# Patient Record
Sex: MALE | Race: OTHER | HISPANIC OR LATINO | URBAN - METROPOLITAN AREA
[De-identification: names, ages, dates, MRNs, and addresses within clinical notes are randomized per-mention and may not be internally consistent; named-entity substitution may affect disease eponyms.]

---

## 2019-07-10 ENCOUNTER — EMERGENCY (EMERGENCY)
Facility: HOSPITAL | Age: 19
LOS: 1 days | Discharge: HOME | End: 2019-07-10
Attending: STUDENT IN AN ORGANIZED HEALTH CARE EDUCATION/TRAINING PROGRAM
Payer: MEDICAID

## 2019-07-10 VITALS
OXYGEN SATURATION: 98 % | TEMPERATURE: 98 F | HEIGHT: 69 IN | DIASTOLIC BLOOD PRESSURE: 74 MMHG | SYSTOLIC BLOOD PRESSURE: 126 MMHG | RESPIRATION RATE: 20 BRPM | WEIGHT: 164.91 LBS | HEART RATE: 106 BPM

## 2019-07-10 PROCEDURE — 99283 EMERGENCY DEPT VISIT LOW MDM: CPT

## 2019-07-10 PROCEDURE — 99053 MED SERV 10PM-8AM 24 HR FAC: CPT

## 2019-07-10 RX ORDER — ACETAMINOPHEN 500 MG
650 TABLET ORAL ONCE
Refills: 0 | Status: COMPLETED | OUTPATIENT
Start: 2019-07-10 | End: 2019-07-10

## 2019-07-11 VITALS — HEART RATE: 71 BPM

## 2019-07-11 PROCEDURE — 73610 X-RAY EXAM OF ANKLE: CPT

## 2019-07-11 PROCEDURE — 99283 EMERGENCY DEPT VISIT LOW MDM: CPT

## 2019-07-11 PROCEDURE — 73610 X-RAY EXAM OF ANKLE: CPT | Mod: 26,LT

## 2019-07-11 RX ADMIN — Medication 650 MILLIGRAM(S): at 00:02

## 2019-07-11 NOTE — ED PROVIDER NOTE - ATTENDING CONTRIBUTION TO CARE
I personally saw the patient with the PA, and completed the key components of the history and physical exam. I then discussed the management plan with the PA.    left ankle sprain

## 2019-07-11 NOTE — ED PROVIDER NOTE - CLINICAL SUMMARY MEDICAL DECISION MAKING FREE TEXT BOX
Pt with left ankle pain s/p inversion injury. Already in air cast from home, ambulating with crutches. Will check xray to eval for ankle fracture, tylenol for pain. F/u ortho if symptoms persist.

## 2019-07-11 NOTE — ED ADULT NURSE NOTE - NSIMPLEMENTINTERV_GEN_ALL_ED
Implemented All Fall Risk Interventions:  [place] to call system. Call bell, personal items and telephone within reach. Instruct patient to call for assistance. Room bathroom lighting operational. Non-slip footwear when patient is off stretcher. Physically safe environment: no spills, clutter or unnecessary equipment. Stretcher in lowest position, wheels locked, appropriate side rails in place. Provide visual cue, wrist band, yellow gown, etc. Monitor gait and stability. Monitor for mental status changes and reorient to person, place, and time. Review medications for side effects contributing to fall risk. Reinforce activity limits and safety measures with patient and family.

## 2019-07-11 NOTE — ED PROVIDER NOTE - NS ED ROS FT
Gen: denies weakness, malaise/fatigue, fever, chills  Skin: denies rashes, hives  HEENT: denies headaches, LOC, visual changes, congestion  Respiratory: denies cough, dyspnea, TURNER, SOB, wheezing  Cardiovascular: denies chest pain, palpitations, diaphoresis, LE edema  MSK: +LEFT ANKLE PAIN. Denies limitation on movement, weakness, joint redness/warmth  Neuro: denies LOC, convulsions/seizures, syncope, headache, dizziness, vertigo, numbness/tingling

## 2019-07-11 NOTE — ED PROVIDER NOTE - PHYSICAL EXAMINATION
Const: Awake, alert and oriented. In no acute distress. Well appearing.  HEENT: NC/AT. Moist mucous membranes.  Eyes: No scleral icterus. EOMI.  Neck:. Soft and supple. Full ROM without pain.  Cardiac: Regular rate and regular rhythm. +S1/S2. Peripheral pulses 2+ and symmetric. No LE edema.  Resp: Speaking in full sentences. No evidence of respiratory distress. No wheezes, rales or rhonchi.  MSK: Mild swelling noted to lateral malleolus on left. FROM at ankle b/l. TTP over lateral malleolus. Pt able to partial weight bear. DP pulses 2+ b/l.  Skin: No bruising, rashes, abrasions or lacerations.  Neuro: Awake, alert & oriented x 3. Sensorimotor intact x 4. Moves all extremities symmetrically.

## 2019-07-11 NOTE — ED PROVIDER NOTE - OBJECTIVE STATEMENT
19y male no pmhx presenting to ED for left ankle pain s/p inversion injury while playing soccer approx 6 hours ago. pt states he was playing soccer and "rolled" his left ankle. Pt took 1 ibuprofen x 3 hours ago with minimal relief. Pt arrives to ED with air cast on and crutches, pt already had at home. No further complaints. Denies joint redness, warmth, laceration, fall, head injury, LOC, CP, SOB.

## 2019-07-11 NOTE — ED ADULT NURSE NOTE - OBJECTIVE STATEMENT
Pt. complaining of left ankle pain.  Pt. states he fell while playing soccer.  Left ankle edema noted; no redness noted; + pulses noted to LLE.  Pt. unable to bear weight to LLE; pt. arrived to ED using crutches from home.  Pta, pt applied ice with minimal relief.

## 2019-07-11 NOTE — ED PROVIDER NOTE - PROGRESS NOTE DETAILS
Xray negative for fracture. Pt encouraged to use ibuprofen and/or tylenol as needed for pain control. Pt educated on RICE measures for pain relief including rest, ice applied to affected area, compression of area with ace wrap and elevation of extremity above heart level. Pt already has air cast and crutches from home. Pt provided with referral for orthopedic doctor and instructed to follow-up within 1-2 weeks if symptoms persist.

## 2019-11-24 ENCOUNTER — EMERGENCY (EMERGENCY)
Facility: HOSPITAL | Age: 19
LOS: 1 days | Discharge: HOME | End: 2019-11-24
Attending: EMERGENCY MEDICINE
Payer: MEDICAID

## 2019-11-24 VITALS
HEIGHT: 64 IN | RESPIRATION RATE: 20 BRPM | HEART RATE: 71 BPM | OXYGEN SATURATION: 99 % | SYSTOLIC BLOOD PRESSURE: 107 MMHG | WEIGHT: 128.09 LBS | TEMPERATURE: 98 F | DIASTOLIC BLOOD PRESSURE: 58 MMHG

## 2019-11-24 PROBLEM — Z78.9 OTHER SPECIFIED HEALTH STATUS: Chronic | Status: ACTIVE | Noted: 2019-07-11

## 2019-11-24 PROCEDURE — 70450 CT HEAD/BRAIN W/O DYE: CPT

## 2019-11-24 PROCEDURE — 99284 EMERGENCY DEPT VISIT MOD MDM: CPT

## 2019-11-24 PROCEDURE — 70450 CT HEAD/BRAIN W/O DYE: CPT | Mod: 26

## 2019-11-24 RX ORDER — NEOMYCIN/POLYMYXIN B/HYDROCORT
4 SUSPENSION, DROPS(FINAL DOSAGE FORM)(ML) OTIC (EAR) ONCE
Refills: 0 | Status: COMPLETED | OUTPATIENT
Start: 2019-11-24 | End: 2019-11-24

## 2019-11-24 RX ADMIN — Medication 4 DROP(S): at 11:52

## 2019-11-24 NOTE — ED STATDOCS - OBJECTIVE STATEMENT
18 y/o M pt with no significant pmhx presents to the ED c/o intermittent R ear pain and headache which. Pt states that he saw his PMD few days and was prescribed ibuprofen with no relief. Pt reports that he feels the headache every 2 hours and is worsening since yesterday. Pt states that he will be seeing his neurologist soon. He suspect that he has developed ear infection which is worsening. Denies nausea, vomiting, diarrhea, fever, chills, diaphoresis, ear discharge, dizziness, syncope, recent travilling. Pt has no further complaints at the moment.

## 2019-11-24 NOTE — ED STATDOCS - CARE PLAN
Principal Discharge DX:	Headache  Assessment and plan of treatment:	Continue with medication as prescribed   F/U [facility]  Secondary Diagnosis:	Otitis externa

## 2019-11-24 NOTE — ED STATDOCS - PHYSICAL EXAMINATION
R ear erythema, no exudate, normal TM + light reflex. no mastoid tenderness, on pal, throt slight, no darinaige,

## 2019-11-24 NOTE — ED STATDOCS - CLINICAL SUMMARY MEDICAL DECISION MAKING FREE TEXT BOX
patient presenting with R ear pain and headache. Will do CT head, give pain medications, ear drops and reevaluate.

## 2019-11-24 NOTE — ED STATDOCS - PATIENT PORTAL LINK FT
You can access the FollowMyHealth Patient Portal offered by [facility] by registering at the following website: [email or web address]. By joining [employer]’s FollowMyHealth portal, you will also be able to view your health information using other applications (apps) compatible with our system.

## 2019-11-24 NOTE — ED ADULT TRIAGE NOTE - CHIEF COMPLAINT QUOTE
headache , went to pmd was given meds but doesn't know what it was , was told he has ear infection not better

## 2019-11-24 NOTE — ED STATDOCS - ENMT, MLM
R ear erythema, no exudate, normal TM, Throat has no vesicles, no oropharyngeal exudates and uvula is midline.

## 2020-01-31 ENCOUNTER — APPOINTMENT (OUTPATIENT)
Dept: DERMATOLOGY | Facility: CLINIC | Age: 20
End: 2020-01-31
Payer: MEDICAID

## 2020-01-31 PROCEDURE — 99203 OFFICE O/P NEW LOW 30 MIN: CPT

## 2021-01-07 ENCOUNTER — APPOINTMENT (OUTPATIENT)
Dept: FAMILY MEDICINE | Facility: CLINIC | Age: 21
End: 2021-01-07
Payer: MEDICAID

## 2021-01-07 VITALS
OXYGEN SATURATION: 97 % | TEMPERATURE: 98.6 F | BODY MASS INDEX: 24.07 KG/M2 | DIASTOLIC BLOOD PRESSURE: 70 MMHG | HEIGHT: 64 IN | SYSTOLIC BLOOD PRESSURE: 122 MMHG | HEART RATE: 82 BPM | WEIGHT: 141 LBS | RESPIRATION RATE: 14 BRPM

## 2021-01-07 DIAGNOSIS — Z78.9 OTHER SPECIFIED HEALTH STATUS: ICD-10-CM

## 2021-01-07 DIAGNOSIS — Z76.89 PERSONS ENCOUNTERING HEALTH SERVICES IN OTHER SPECIFIED CIRCUMSTANCES: ICD-10-CM

## 2021-01-07 DIAGNOSIS — H66.92 OTITIS MEDIA, UNSPECIFIED, LEFT EAR: ICD-10-CM

## 2021-01-07 PROCEDURE — 99385 PREV VISIT NEW AGE 18-39: CPT | Mod: 25

## 2021-01-07 PROCEDURE — 99072 ADDL SUPL MATRL&STAF TM PHE: CPT

## 2021-01-07 PROCEDURE — 99213 OFFICE O/P EST LOW 20 MIN: CPT | Mod: 25

## 2021-01-07 RX ORDER — IBUPROFEN 200 MG/1
CAPSULE, LIQUID FILLED ORAL
Refills: 0 | Status: ACTIVE | COMMUNITY

## 2021-01-07 NOTE — ASSESSMENT
[FreeTextEntry1] : 21 y/o male with no significant PMHx presenting to establish as a new patient in the practice. Pt c/o chronic LEFT knee pain and low back pain\par \par MSK: Lumbalgia, LEFT knee pain\par -L-spine XRay with bend views\par -LEFT knee xray\par -May benefit from Chiropractic care\par -Continue Stretching exercises.\par \par HCM:\par -Declines Flu vaccine\par -Blood work at outside lab\par -Verbally consented to HIV / Hep C testing

## 2021-01-07 NOTE — COUNSELING
[Fall prevention counseling provided] : Fall prevention counseling provided [Adequate lighting] : Adequate lighting [No throw rugs] : No throw rugs [Behavioral health counseling provided] : Behavioral health counseling provided [Sleep ___ hours/day] : Sleep [unfilled] hours/day [AUDIT-C Screening administered and reviewed] : AUDIT-C Screening administered and reviewed [Hazards of at-risk alcohol use discussed] : Hazards of at-risk alcohol use discussed [Strategies to reduce or eliminate alcohol use discussed] : Strategies to reduce or eliminate alcohol use discussed [Quit Drinking] : Quit Drinking [Encouraged to increase physical activity] : Encouraged to increase physical activity [____ min/wk Activity] : [unfilled] min/wk activity [None] : None [Good understanding] : Patient has a good understanding of lifestyle changes and steps needed to achieve self management goal

## 2021-01-07 NOTE — HEALTH RISK ASSESSMENT
[Good] : ~his/her~  mood as  good [0-5] : 0-5 [Yes] : Yes [2 - 4 times a month (2 pts)] : 2-4 times a month (2 points) [5 or 6 (2 pts)] : 5 or 6 (2  points) [Weekly (3 pts)] : Weekly (3 points) [No] : In the past 12 months have you used drugs other than those required for medical reasons? No [No falls in past year] : Patient reported no falls in the past year [0] : 2) Feeling down, depressed, or hopeless: Not at all (0) [HIV Test offered] : HIV Test offered [Hepatitis C test offered] : Hepatitis C test offered [Financial] : financial [With Family] : lives with family [Unemployed] : unemployed [# Of Children ___] : has [unfilled] children [Sexually Active] : sexually active [Feels Safe at Home] : Feels safe at home [Fully functional (bathing, dressing, toileting, transferring, walking, feeding)] : Fully functional (bathing, dressing, toileting, transferring, walking, feeding) [Fully functional (using the telephone, shopping, preparing meals, housekeeping, doing laundry, using] : Fully functional and needs no help or supervision to perform IADLs (using the telephone, shopping, preparing meals, housekeeping, doing laundry, using transportation, managing medications and managing finances) [Reports normal functional visual acuity (ie: able to read med bottle)] : Reports normal functional visual acuity [Smoke Detector] : smoke detector [Seat Belt] :  uses seat belt [With Patient/Caregiver] : With Patient/Caregiver [Name: ___] : Health Care Proxy's Name: [unfilled]  [Relationship: ___] : Relationship: [unfilled] [# of Members in Household ___] :  household currently consist of [unfilled] member(s) [Single] : single [] : No [de-identified] : Denies [de-identified] : denies [Audit-CScore] : 7 [de-identified] : Working [de-identified] : eggs, beans, rice [EAZ1Etemp] : 0 [LowDoseCTScan] : n/a [EyeExamDate] : denies [Change in mental status noted] : No change in mental status noted [Language] : denies difficulty with language [Behavior] : denies difficulty with behavior [Learning/Retaining New Information] : denies difficulty learning/retaining new information [Handling Complex Tasks] : denies difficulty handling complex tasks [Reasoning] : denies difficulty with reasoning [Spatial Ability and Orientation] : denies difficulty with spatial ability and orientation [High Risk Behavior] : no high risk behavior [Reports changes in hearing] : Reports no changes in hearing [Reports changes in vision] : Reports no changes in vision [Reports changes in dental health] : Reports no changes in dental health [Carbon Monoxide Detector] : no carbon monoxide detector [Guns at Home] : no guns at home [Safety elements used in home] : no safety elements used in home [Sunscreen] : does not use sunscreen [Travel to Developing Areas] : does not  travel to developing areas [TB Exposure] : is not being exposed to tuberculosis [MammogramDate] : n/a [PapSmearDate] : n/a [BoneDensityDate] : n/a [ColonoscopyDate] : n/a [AdvancecareDate] : 01/21

## 2021-01-07 NOTE — HISTORY OF PRESENT ILLNESS
[FreeTextEntry1] : Pt is here to establish PCP and CPE. [de-identified] : 21 y/o male with no significant PMHx presenting to establish as a new patient in the practice. Pt c/o chronic LEFT knee pain for which he had to stop working ( [employer]) due to pain, pt denies any accidents/falls to explain the pain. Pt also c/o lower back pain which is chronic, was told by specialist it was due to spasms, had PT but no improvement. Pt does not takes any medications for it.

## 2021-01-07 NOTE — REVIEW OF SYSTEMS
[Joint Pain] : joint pain [Back Pain] : back pain [Negative] : Heme/Lymph [FreeTextEntry9] : See HPI

## 2021-01-12 ENCOUNTER — APPOINTMENT (OUTPATIENT)
Dept: RADIOLOGY | Facility: CLINIC | Age: 21
End: 2021-01-12
Payer: MEDICAID

## 2021-01-12 ENCOUNTER — OUTPATIENT (OUTPATIENT)
Dept: OUTPATIENT SERVICES | Facility: HOSPITAL | Age: 21
LOS: 1 days | End: 2021-01-12
Payer: MEDICAID

## 2021-01-12 DIAGNOSIS — M54.9 DORSALGIA, UNSPECIFIED: ICD-10-CM

## 2021-01-12 DIAGNOSIS — M25.562 PAIN IN LEFT KNEE: ICD-10-CM

## 2021-01-12 PROCEDURE — 73564 X-RAY EXAM KNEE 4 OR MORE: CPT | Mod: 26,LT

## 2021-01-12 PROCEDURE — 72114 X-RAY EXAM L-S SPINE BENDING: CPT | Mod: 26

## 2021-01-12 PROCEDURE — 73564 X-RAY EXAM KNEE 4 OR MORE: CPT

## 2021-01-12 PROCEDURE — 72114 X-RAY EXAM L-S SPINE BENDING: CPT

## 2021-05-20 DIAGNOSIS — R07.89 OTHER CHEST PAIN: ICD-10-CM

## 2021-09-30 DIAGNOSIS — M54.9 DORSALGIA, UNSPECIFIED: ICD-10-CM

## 2021-09-30 DIAGNOSIS — R53.83 OTHER FATIGUE: ICD-10-CM

## 2021-09-30 DIAGNOSIS — Z20.822 CONTACT WITH AND (SUSPECTED) EXPOSURE TO COVID-19: ICD-10-CM

## 2021-09-30 DIAGNOSIS — M25.562 PAIN IN LEFT KNEE: ICD-10-CM

## 2021-09-30 DIAGNOSIS — Z00.00 ENCOUNTER FOR GENERAL ADULT MEDICAL EXAMINATION W/OUT ABNORMAL FINDINGS: ICD-10-CM

## 2021-12-13 LAB
ALBUMIN SERPL ELPH-MCNC: 5 G/DL
ALP BLD-CCNC: 79 U/L
ALT SERPL-CCNC: 15 U/L
ANION GAP SERPL CALC-SCNC: 13 MMOL/L
APPEARANCE: CLEAR
AST SERPL-CCNC: 21 U/L
BASOPHILS # BLD AUTO: 0.03 K/UL
BASOPHILS NFR BLD AUTO: 0.6 %
BILIRUB SERPL-MCNC: 1.3 MG/DL
BILIRUBIN URINE: NEGATIVE
BLOOD URINE: NEGATIVE
BUN SERPL-MCNC: 16 MG/DL
CALCIUM SERPL-MCNC: 10.4 MG/DL
CHLORIDE SERPL-SCNC: 103 MMOL/L
CHOLEST SERPL-MCNC: 133 MG/DL
CO2 SERPL-SCNC: 24 MMOL/L
COLOR: YELLOW
CREAT SERPL-MCNC: 0.82 MG/DL
EOSINOPHIL # BLD AUTO: 0.11 K/UL
EOSINOPHIL NFR BLD AUTO: 2.1 %
ERYTHROCYTE [SEDIMENTATION RATE] IN BLOOD BY WESTERGREN METHOD: < 2 MM/HR
GLUCOSE QUALITATIVE U: NEGATIVE
GLUCOSE SERPL-MCNC: 98 MG/DL
HCT VFR BLD CALC: 46 %
HDLC SERPL-MCNC: 58 MG/DL
HGB BLD-MCNC: 15.9 G/DL
IMM GRANULOCYTES NFR BLD AUTO: 0.2 %
KETONES URINE: NEGATIVE
LDLC SERPL CALC-MCNC: 64 MG/DL
LEUKOCYTE ESTERASE URINE: NEGATIVE
LYMPHOCYTES # BLD AUTO: 1.99 K/UL
LYMPHOCYTES NFR BLD AUTO: 38.5 %
MAN DIFF?: NORMAL
MCHC RBC-ENTMCNC: 30.9 PG
MCHC RBC-ENTMCNC: 34.6 GM/DL
MCV RBC AUTO: 89.3 FL
MONOCYTES # BLD AUTO: 0.47 K/UL
MONOCYTES NFR BLD AUTO: 9.1 %
NEUTROPHILS # BLD AUTO: 2.56 K/UL
NEUTROPHILS NFR BLD AUTO: 49.5 %
NITRITE URINE: NEGATIVE
NONHDLC SERPL-MCNC: 75 MG/DL
PH URINE: 7
PLATELET # BLD AUTO: 340 K/UL
POTASSIUM SERPL-SCNC: 4.8 MMOL/L
PROT SERPL-MCNC: 7.2 G/DL
PROTEIN URINE: NORMAL
RBC # BLD: 5.15 M/UL
RBC # FLD: 12 %
SODIUM SERPL-SCNC: 140 MMOL/L
SPECIFIC GRAVITY URINE: 1.02
TRIGL SERPL-MCNC: 56 MG/DL
TSH SERPL-ACNC: 1.09 UIU/ML
UROBILINOGEN URINE: NORMAL
WBC # FLD AUTO: 5.17 K/UL

## 2022-06-07 NOTE — ED ADULT NURSE NOTE - CAS TRG GEN SKIN COLOR
Impression: Regular astigmatism, bilateral: H52.223.  Plan: PLAN LRI AT TIME OF CATARACT SX Normal for race

## 2022-06-28 ENCOUNTER — APPOINTMENT (OUTPATIENT)
Dept: FAMILY MEDICINE | Facility: CLINIC | Age: 22
End: 2022-06-28

## 2022-08-21 ENCOUNTER — NON-APPOINTMENT (OUTPATIENT)
Age: 22
End: 2022-08-21

## 2022-11-08 ENCOUNTER — NON-APPOINTMENT (OUTPATIENT)
Age: 22
End: 2022-11-08

## 2022-12-07 ENCOUNTER — APPOINTMENT (OUTPATIENT)
Dept: FAMILY MEDICINE | Facility: CLINIC | Age: 22
End: 2022-12-07

## 2022-12-12 NOTE — ED STATDOCS - ATTENDING CONTRIBUTION TO CARE
Arpit 18yo male with R ear pain x 5 days, a/w headache. No fevers/chills. Patient seen at  5 days ago and given amoxicillin and ibuprofen but not having any relief from pain. No discharge from ear. Denies FB to ear. No neck stiffness. No vomiting. PE remarkable for R auditory canal erythema without debris, R TM intact without bulging/erythema. Mild TTP over mastoid process on right. Will obtain CT head eval for mastoiditis (low suspicion), suspect otitis externa- will give topical antibiotics, analgesia and reassess. Anjelica Schneider DO

## 2022-12-14 ENCOUNTER — APPOINTMENT (OUTPATIENT)
Dept: FAMILY MEDICINE | Facility: CLINIC | Age: 22
End: 2022-12-14

## 2023-03-02 ENCOUNTER — NON-APPOINTMENT (OUTPATIENT)
Age: 23
End: 2023-03-02

## 2023-03-17 ENCOUNTER — APPOINTMENT (OUTPATIENT)
Dept: PODIATRY | Facility: CLINIC | Age: 23
End: 2023-03-17

## 2023-06-14 ENCOUNTER — NON-APPOINTMENT (OUTPATIENT)
Age: 23
End: 2023-06-14

## 2023-06-15 ENCOUNTER — RESULT REVIEW (OUTPATIENT)
Age: 23
End: 2023-06-15

## 2025-03-06 ENCOUNTER — EMERGENCY (EMERGENCY)
Facility: HOSPITAL | Age: 25
LOS: 1 days | Discharge: HOME | End: 2025-03-06
Attending: STUDENT IN AN ORGANIZED HEALTH CARE EDUCATION/TRAINING PROGRAM
Payer: COMMERCIAL

## 2025-03-06 VITALS
TEMPERATURE: 98 F | WEIGHT: 144.18 LBS | DIASTOLIC BLOOD PRESSURE: 68 MMHG | SYSTOLIC BLOOD PRESSURE: 98 MMHG | OXYGEN SATURATION: 98 % | HEART RATE: 68 BPM | RESPIRATION RATE: 16 BRPM | HEIGHT: 67 IN

## 2025-03-06 PROCEDURE — 99284 EMERGENCY DEPT VISIT MOD MDM: CPT

## 2025-03-06 PROCEDURE — 96372 THER/PROPH/DIAG INJ SC/IM: CPT

## 2025-03-06 PROCEDURE — 99283 EMERGENCY DEPT VISIT LOW MDM: CPT | Mod: 25

## 2025-03-06 RX ORDER — METHOCARBAMOL 500 MG/1
2 TABLET, FILM COATED ORAL
Qty: 18 | Refills: 0
Start: 2025-03-06 | End: 2025-03-08

## 2025-03-06 RX ORDER — KETOROLAC TROMETHAMINE 30 MG/ML
30 INJECTION, SOLUTION INTRAMUSCULAR; INTRAVENOUS ONCE
Refills: 0 | Status: DISCONTINUED | OUTPATIENT
Start: 2025-03-06 | End: 2025-03-06

## 2025-03-06 RX ORDER — DEXAMETHASONE 0.5 MG/1
6 TABLET ORAL ONCE
Refills: 0 | Status: COMPLETED | OUTPATIENT
Start: 2025-03-06 | End: 2025-03-06

## 2025-03-06 RX ORDER — METHOCARBAMOL 500 MG/1
1500 TABLET, FILM COATED ORAL ONCE
Refills: 0 | Status: COMPLETED | OUTPATIENT
Start: 2025-03-06 | End: 2025-03-06

## 2025-03-06 RX ADMIN — KETOROLAC TROMETHAMINE 30 MILLIGRAM(S): 30 INJECTION, SOLUTION INTRAMUSCULAR; INTRAVENOUS at 08:03

## 2025-03-06 RX ADMIN — DEXAMETHASONE 6 MILLIGRAM(S): 0.5 TABLET ORAL at 08:03

## 2025-03-06 RX ADMIN — METHOCARBAMOL 1500 MILLIGRAM(S): 500 TABLET, FILM COATED ORAL at 08:03

## 2025-03-06 NOTE — ED PROVIDER NOTE - PATIENT PORTAL LINK FT
You can access the FollowMyHealth Patient Portal offered by [facility] by registering at the following website: [email or web address]. By joining [employer]’s FollowMyHealth portal, you will also be able to view your health information using other applications (apps) compatible with our system.

## 2025-03-06 NOTE — ED PROVIDER NOTE - PHYSICAL EXAMINATION
Const: Awake, alert and oriented. In no acute distress. Well appearing.  HEENT: NC/AT. Moist mucous membranes.  Eyes: No scleral icterus. EOMI.  Neck:. Soft and supple. Full ROM without pain.  Cardiac: . +S1/S2. No murmurs. Peripheral pulses 2+ and symmetric. No LE edema.  Resp: Speaking in full sentences. No evidence of respiratory distress. No wheezes, rales or rhonchi.  Abd: Soft, non-tender, non-distended. Normal bowel sounds in all 4 quadrants. No guarding or rebound.  Back: Spine midline and non-tender. No CVAT.  Skin: No rashes, abrasions or lacerations.  Lymph: No cervical lymphadenopathy.  Neuro: Awake, alert & oriented x 3. CN II-XII intact finger to nose intact neurovasculary intact muscle strength fair gait without ataxia

## 2025-03-06 NOTE — ED PROVIDER NOTE - CARE PROVIDER_API CALL
Jamar Fitzpatrick  Spine Surgery  [address] [place] [id number]  Phone: [phone]  Fax: [phone]  Follow Up Time:

## 2025-03-06 NOTE — ED PROVIDER NOTE - ATTENDING APP SHARED VISIT CONTRIBUTION OF CARE
24-year-old male history of lower back pain secondary to a slipped disc, recently employed as a  now with worsening lower back pain.  States that the pain is also now coming down his left leg, he has been taking supportive medications, he also applied a brace to his left leg in hopes that this would help with the pain however he notes that it is become more severe.  He describes it as a stiffness in his lower back, feels worse with immobility, improved with mobilization and stretching.  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  NKDA, no history of surgeries in the past, did not take any medications for the pain prior to arrival.  On examination ambulatory without difficulty, neurovascularly intact bilateral lower extremities with no pedal edema, no midline tenderness to palpation along the spine.  There is tenderness to palpation on pressing the lateral gluteteal muscles bilaterally.  On the left the patient does report some pain radiating down the leg consistent with radiculopathy.  Differential diagnosis does include radiculopathy secondary to slipped disc, piriformis syndrome, gluteal muscle strain, amongst other etiologies.  No signs of neurovascular compromise at this time and imaging is not warranted, no incontinence of bowel or bladder, ambulatory without issue.  Will trial analgesics as well as muscle relaxant, steroids for anti-inflammatory effect.  If patient improving likely stable for outpatient follow-up with spine with return precautions for any signs or symptoms of worsening.

## 2025-03-06 NOTE — ED PROVIDER NOTE - NSFOLLOWUPINSTRUCTIONS_ED_ALL_ED_FT
Back Pain    Back pain is very common in adults. The cause of back pain is rarely dangerous and the pain often gets better over time. The cause of your back pain may not be known and may include strain of muscles or ligaments, degeneration of the spinal disks, or arthritis. Occasionally the pain may radiate down your leg(s). Over-the-counter medicines to reduce pain and inflammation are often the most helpful. Stretching and remaining active frequently helps the healing process.     SEEK IMMEDIATE MEDICAL CARE IF YOU HAVE ANY OF THE FOLLOWING SYMPTOMS: bowel or bladder control problems, unusual weakness or numbness in your arms or legs, nausea or vomiting, abdominal pain, fever, dizziness/lightheadedness.     please follow up with spine doctor  medications sent to [facility]

## 2025-03-06 NOTE — ED PROVIDER NOTE - CLINICAL SUMMARY MEDICAL DECISION MAKING FREE TEXT BOX
pt is a 24 year old male presenting for lower back pain for the past few days. pt states pain is across the back and radiates down both his legs. pt is a  and he believes pain is from him sitting all day. pt with hx of back pain in the past states he was told he has herinated disc, he does not follow up with a doctor currently. pt denies recent injuries or trauma to the area. he has been taking naproxen for the pain, last took last night. no red flags for back pain no neuro deficits on exam, anti inflammatories muscle relaxer decadron pt is a 24 year old male presenting for lower back pain for the past few days. pt states pain is across the back and radiates down both his legs. pt is a  and he believes pain is from him sitting all day. pt with hx of back pain in the past states he was told he has herinated disc, he does not follow up with a doctor currently. pt denies recent injuries or trauma to the area. he has been taking naproxen for the pain, last took last night. no red flags for back pain no neuro deficits on exam, anti inflammatories muscle relaxer decadron, will give spine referral, return to the ed for any worsening symptoms

## 2025-03-06 NOTE — ED ADULT NURSE NOTE - OBJECTIVE STATEMENT
Pt A+Ox4 c/o back pain that started at work Monday. pt denies injury to back or recent falls. Pt states pain is in the lower back, and that he has been taking naproxen at home with little to no relief. Pt denies SOB, Cp, HA, ABD pain, N/V/D, or dizziness. Respirations are equal and unlabored on room air, pt speaking complete coherent sentences.

## 2025-03-06 NOTE — ED ADULT TRIAGE NOTE - CHIEF COMPLAINT QUOTE
c/o lower back pain since Monday.  Hx of chronic back pain. denies recent heavy lifting, falls, incontinence.

## 2025-03-06 NOTE — ED PROVIDER NOTE - CROS ED ENMT ALL NEG
Bed: 01  Expected date: 5/17/20  Expected time: 9:29 PM  Means of arrival:   Comments:  [facility] - 091 YOFALL/ON PLAV[place]/FREQUENT FALLS   negative...

## 2025-03-06 NOTE — ED PROVIDER NOTE - NS ED MD DISPO DISCHARGE CCDA
Received copy of records from [facility].  Placed on Mehta Ly desk to review    CT scan, US ATKINSON, labs Patient/Caregiver provided printed discharge information.

## 2025-03-06 NOTE — ED PROVIDER NOTE - OBJECTIVE STATEMENT
pt is a 24 year old male presenting for lower back pain for the past few days. pt states pain is across the back and radiates down both his legs. pt is a  and he believes pain is from him sitting all day. pt with hx of back pain in the past states he was told he has herinated disc, he does not follow up with a doctor currently. pt denies recent injuries or trauma to the area. he has been taking naproxen for the pain, last took last night.   pt denies cp sob fever headache neck pain visual changes abd pain nausea vomiting weight loss urinary or fecal incontinence numbness or loss of sensation

## 2025-03-24 ENCOUNTER — EMERGENCY (EMERGENCY)
Facility: HOSPITAL | Age: 25
LOS: 1 days | Discharge: HOME | End: 2025-03-24
Attending: STUDENT IN AN ORGANIZED HEALTH CARE EDUCATION/TRAINING PROGRAM
Payer: COMMERCIAL

## 2025-03-24 VITALS
OXYGEN SATURATION: 96 % | WEIGHT: 137.57 LBS | DIASTOLIC BLOOD PRESSURE: 84 MMHG | SYSTOLIC BLOOD PRESSURE: 130 MMHG | RESPIRATION RATE: 18 BRPM | HEIGHT: 67 IN | TEMPERATURE: 98 F | HEART RATE: 85 BPM

## 2025-03-24 PROCEDURE — 96372 THER/PROPH/DIAG INJ SC/IM: CPT

## 2025-03-24 PROCEDURE — 99283 EMERGENCY DEPT VISIT LOW MDM: CPT | Mod: 25

## 2025-03-24 PROCEDURE — 99284 EMERGENCY DEPT VISIT MOD MDM: CPT

## 2025-03-24 RX ORDER — KETOROLAC TROMETHAMINE 30 MG/ML
60 INJECTION, SOLUTION INTRAMUSCULAR; INTRAVENOUS ONCE
Refills: 0 | Status: DISCONTINUED | OUTPATIENT
Start: 2025-03-24 | End: 2025-03-24

## 2025-03-24 RX ORDER — METHYLPREDNISOLONE ACETATE 80 MG/ML
1 INJECTION, SUSPENSION INTRA-ARTICULAR; INTRALESIONAL; INTRAMUSCULAR; SOFT TISSUE
Qty: 1 | Refills: 0
Start: 2025-03-24 | End: 2025-03-29

## 2025-03-24 RX ADMIN — KETOROLAC TROMETHAMINE 60 MILLIGRAM(S): 30 INJECTION, SOLUTION INTRAMUSCULAR; INTRAVENOUS at 14:00

## 2025-03-24 RX ADMIN — KETOROLAC TROMETHAMINE 60 MILLIGRAM(S): 30 INJECTION, SOLUTION INTRAMUSCULAR; INTRAVENOUS at 13:42

## 2025-03-24 NOTE — ED PROVIDER NOTE - ATTENDING CONTRIBUTION TO CARE
25-year-old male with no PMH presents with lower back pain for a month.  Patient reports lower back pain at times with tingling not numbness to bilateral legs.  Patient states the pain is worse when he is sitting for a long period of time which she does in his new job as a . Pt denies fevers/chills, ha, loc, focal neuro deficits, cp/sob/palp, cough, abd pain/n/v/d, urinary symptoms, recent travel and sick contacts.  Pt denies IVDU, bladder/bowel incontinence. saddle anesthesia, neuro deficits. No midline spine tenderness, no step offs. + Paraspinal ttp. Likely msk pain, analgesia, reassess

## 2025-03-24 NOTE — ED PROVIDER NOTE - CLINICAL SUMMARY MEDICAL DECISION MAKING FREE TEXT BOX
24yo M no pmhx presenting today with worsening low back pain x 1 month. PE remarkable for TTP of b/l lumbar paraspinal musculature and ttp along right gluteus jimbo.

## 2025-03-24 NOTE — ED PROVIDER NOTE - CROS ED [facility] ALL NEG
Physical Therapy Evaluation    Visit Type: Initial Evaluation  Visit: 1  Referring Provider: Gerry Pratt MD  Medical Diagnosis (from order): S89.92XD - Injury of left knee, subsequent encounter  M23.262 - Old bucket handle tear of lateral meniscus of left knee   Treatment Diagnosis: left knee - increased pain/symptoms, impaired posture, impaired range of motion, impaired muscle length/flexibility, impaired joint play/mobility, impaired mobility, impaired strength, increased swelling, impaired gait and impaired activity tolerance.  Onset  - Date of Surgery:  11/6/2024  - Procedure: Left Lateral Knee Scope, Left  Lateral Meniscus Repair Vs Meniscectomy - Left and Left Lateral Knee Arthroscopy, Lateral Meniscus Repair,left Lateral Meniscus Vs Meniscectomy - Left    Diagnosis Precautions: TTWB for 6 weeks  Patient alert and oriented X3.  Chart reviewed at time of initial evaluation (relevant co-morbidities, allergies, tests and medications listed):   - Diagnostic tests reviewed: X-Ray and MRI studies  Past Medical History:  05/09/2019: ACL (anterior cruciate ligament) tear      Comment:  left  No date: Anemia  No date: Anxiety  04/23/2019: Closed head injury without loss of consciousness  04/23/2019: Concussion without loss of consciousness  No date: Depressive disorder  No date: Heart murmur      Past Surgical History:  06/05/2019: Knee scope,aid ant cruciate repair; Left      Comment:  Dr Pratt  Current Outpatient Medications:  valACYclovir (VALTREX) 500 MG tablet, TAKE 1 TABLET BY MOUTH DAILY, Disp: 90 tablet, Rfl: 0  traMADol (ULTRAM) 50 MG tablet, Take 1 tablet by mouth every 6 hours as needed for Pain. Begin taking on November 6, 2024., Disp: 15 tablet, Rfl: 0  ZINC SULFATE PO, Take 1 tablet by mouth daily., Disp: , Rfl:   albuterol (ProAir HFA) 108 (90 Base) MCG/ACT inhaler, Inhale 2 puffs into the lungs every 4 hours as needed for Shortness of Breath or Wheezing. With spacer, Disp: 1 each, Rfl: 2    Current  Facility-Administered Medications:  levonorgestrel (KYLEENA) 19.5 MG intrauterine device 19.5 mg              Imaging:   Reviewed MRI and xray      SUBJECTIVE                                                                                                               Patient presents with complaints of left knee soreness following a torn meniscus which may have occurred Sept 2 playing soccer.  Pt states she could not put wt on it.     Patient bill pacemaker/heart defibrillator, personal hx of cancer, decreased sensation to skin, open wounds/infection on skin, changes in bowel/bladder function, autoimmune disease, metal implants, night sweats, numbness / tingling,         Pain / Symptoms  - Pain/symptom is: intermittent  - Pain rating (out of 10): Current: 5 ; Worst: 7  - Location: Medial left knee  - Quality / Description: sharp, ache, pins and needles, stiff  - Alleviating Factors: ice     - elevation  - Progression since onset: improved    Function:   Limitations / Exacerbation Factors:   - Patient reports pain, difficulty and increased time with function reported below.  - bed mobility, lower body dressing, meal/food prep, driving/riding in a vehicle, grocery shopping, sitting tasks, standing tasks, house/yard work, bending/squatting/lifting, kneeling, lifting/carrying, sitting, standing, walking and squatting/lifting, all types of transfers, stairs, community distances, household distances, now needs to use assistive device  Prior Level of Function: declining function, therefore referred to therapy. worsening pain and function, therefore underwent surgery,    Patient Goals: decreased pain, increased motion, increased strength, return to sport/leisure activities, independence with ADLs/IADLs, decreased edema and improved balance.    Prior treatment  - outpatient PT  - Discharged from hospital, home health, or skilled nursing facility in last 30 days: yes  Home Environment   - Patient lives with: parent or  legal guardian  - Type of home: multiple level home  - Assistance available: consistent  - Denies 2 or more falls or an unexplained fall with injury in the last year.  - Feel safe at home / work / school: yes      OBJECTIVE                                                                                                                     Range of Motion (ROM)   (degrees unless noted; active unless noted; norms in ( ); negative=lacking to 0, positive=beyond 0)  Knee:   - Flexion (150):       Left:   Passive: 60       Right:  138    - Extension (0-10):       Left:  0   Passive: 0       Right:  0                      Treatment     Therapeutic Exercise  QS in long sitting, sitting and standing maintaining TTWB  Hip AD isometrics  HS sets ( issue next visit)  SLR flexion and AB x 5-8 no strain able to determine extensor lag  Heel slides ( not past 80) with strap  Seated knee flexion and extension stretching      Manual Therapy   Superior and inferior patellar mobs with self instructions    Skilled input: verbal instruction/cues    Writer verbally educated and received verbal consent for hand placement, positioning of patient, and techniques to be performed today from patient for clothing adjustments for techniques, therapist position for techniques and hand placement and palpation for techniques as described above and how they are pertinent to the patient's plan of care.      ASSESSMENT                                                                                                          21 year old patient has reported functional limitations listed above impacted by signs and symptoms consistent with treatment diagnosis below.  Treatment Diagnosis:   - Involved: left knee.  - Symptoms/impairments: increased pain/symptoms, impaired posture, impaired range of motion, impaired muscle length/flexibility, impaired joint play/mobility, impaired mobility, impaired strength, increased swelling, impaired gait and impaired  activity tolerance.    Prognosis: Patient will benefit from skilled therapy.  Rehabilitative potential is: very good.  Predicted patient presentation: Low (stable) - Patient comorbidities and complexities, as defined above, will have little effect on progress for prescribed plan of care.  Education:   - Present and ready to learn: patient  - Results of above outlined education: Verbalizes understanding, Demonstrates understanding and Needs reinforcement    PLAN                                                                                                                         The following skilled interventions to be implemented to achieve goals listed below:  Neuromuscular Re-Education (12782)  Therapeutic Activity (90133)  Therapeutic Exercise (49118)  Manual Therapy (99592)  Gait Training (63364)  Dry Needling  Activities of Daily Living/Self Care (51406)  Electrical Stimulation Unattended (57206 or )  Heat/Cold (52562)  Ultrasound (94060)  Vasopneumatic Device (42321)  Laser    Frequency / Duration  2 times per week tapering as patient progresses for 10 weeks for an estimated total of 20 visits    Patient involved in and agreed to plan of care and goals.  Patient given attendance policy at time of initial evaluation.    Suggestions for next session as indicated: Progress per plan of care    Goals  Long Term Goals: to be met by end of plan of care  1. Patient will ambulate independently and without compensation with efficient eccentric control and without compensatory techniques, without pain/symptoms. Improve impaired ROM to: WNL.   2. Patient will ascend and descend one flight of stairs (12 steps or more) using reciprocal pattern, independently with one rail(s) to complete home management and self care.  3. Patient will transfer to and from sitting / standing from a 16 inch height to use toilets and low furniture in home and community independently and without compensation without reported  difficulty.  5. Remote Therapeutic Monitoring (RTM): Patient will access & input their subjective responses pertaining to home program management at least 16-times in a 30-day consecutive period of monitoring for each 30-day period the patient receives monitoring.      Therapy procedure time and total treatment time can be found documented on the Time Entry flowsheet     - - -

## 2025-03-24 NOTE — ED PROVIDER NOTE - NS ED ATTENDING STATEMENT MOD
I have seen and examined this patient and fully participated in the care of this patient as the teaching attending.  The service was shared with the BETTY.  I reviewed and verified the documentation.

## 2025-03-24 NOTE — ED PROVIDER NOTE - NEUROLOGICAL, MLM
Alert and oriented, no focal deficits, no motor or sensory deficits. No saddle anesthesia. DP pulses intact b/l.

## 2025-03-24 NOTE — ED PROVIDER NOTE - MUSCULOSKELETAL, MLM
Spine appears normal, range of motion is not limited. No midline tenderness or gross deformities along entire spine. TTP along right and left paraspinal musculature, ttp along right gluteus jimbo. FROM of hip, back and knee joint

## 2025-03-24 NOTE — ED PROVIDER NOTE - OBJECTIVE STATEMENT
24yo M no pmhx presenting today with worsening low back pain x 1 month. Pt was seen in this ER 2 weeks ago for the same pain, and was discharged home with medications. Pt states that his low back pain is worsening, he still has numbness and tingling down the b/l legs, and he states the pain is now starting to include his glutes. He states the pain makes it hard to sit still or walk. He states the pain started 1 month ago after starting a new job as a , and that the pain is worse with prolonged sitting. He has tried naproxen, stretching and icing for the pain, which does not help. Pt was seen by his PCP 2 days ago and was given Naproxen, and states it does not help. He endorses h/o herniated lumbar disc "years ago". He denies trauma to the area, urinary changes, urinary incontinence, midline tenderness, neck pain, changes in gait, fever, chills

## 2025-03-24 NOTE — ED PROVIDER NOTE - CARE PROVIDER_API CALL
Ivan Cason  Pain Medicine  [address] [place] [address], [place] [address]  Phone: [phone]  Fax: [phone]  Follow Up Time:

## 2025-03-24 NOTE — ED ADULT NURSE NOTE - NSFALLUNIVINTERV_ED_ALL_ED
Bed/Stretcher in lowest position, wheels locked, appropriate side rails in place/Call bell, personal items and telephone in reach/Instruct patient to call for assistance before getting out of bed/chair/stretcher/Non-slip footwear applied when patient is off stretcher/[place] to call system/Physically safe environment - no spills, clutter or unnecessary equipment/Purposeful proactive rounding/Room/bathroom lighting operational, light cord in reach

## 2025-03-24 NOTE — ED PROVIDER NOTE - PATIENT PORTAL LINK FT
You can access the FollowMyHealth Patient Portal offered by [facility] by registering at the following website: [email or web address]. By joining [employer]’s FollowMyHealth portal, you will also be able to view your health information using other applications (apps) compatible with our system.

## 2025-03-24 NOTE — ED ADULT NURSE NOTE - OBJECTIVE STATEMENT
AxOx4. Patient  c/o lower back on the right for a month, pain getting worse. Medicated as per orders.

## 2025-06-06 ENCOUNTER — APPOINTMENT (OUTPATIENT)
Dept: NEUROLOGY | Facility: CLINIC | Age: 25
End: 2025-06-06

## 2025-07-21 ENCOUNTER — EMERGENCY (EMERGENCY)
Facility: HOSPITAL | Age: 25
LOS: 1 days | End: 2025-07-21
Attending: EMERGENCY MEDICINE
Payer: COMMERCIAL

## 2025-07-21 VITALS
DIASTOLIC BLOOD PRESSURE: 76 MMHG | TEMPERATURE: 99 F | RESPIRATION RATE: 18 BRPM | HEART RATE: 109 BPM | WEIGHT: 139.55 LBS | SYSTOLIC BLOOD PRESSURE: 127 MMHG | OXYGEN SATURATION: 95 %

## 2025-07-21 VITALS
OXYGEN SATURATION: 98 % | HEART RATE: 88 BPM | DIASTOLIC BLOOD PRESSURE: 63 MMHG | SYSTOLIC BLOOD PRESSURE: 114 MMHG | TEMPERATURE: 98 F | RESPIRATION RATE: 17 BRPM

## 2025-07-21 LAB
ALBUMIN SERPL ELPH-MCNC: 3.9 G/DL — SIGNIFICANT CHANGE UP (ref 3.3–5.2)
ALP SERPL-CCNC: 62 U/L — SIGNIFICANT CHANGE UP (ref 40–120)
ALT FLD-CCNC: 10 U/L — SIGNIFICANT CHANGE UP
ANION GAP SERPL CALC-SCNC: 14 MMOL/L — SIGNIFICANT CHANGE UP (ref 5–17)
ANISOCYTOSIS BLD QL: SLIGHT — SIGNIFICANT CHANGE UP
AST SERPL-CCNC: 19 U/L — SIGNIFICANT CHANGE UP
BASOPHILS # BLD AUTO: 0.02 K/UL — SIGNIFICANT CHANGE UP (ref 0–0.2)
BASOPHILS # BLD MANUAL: 0 K/UL — SIGNIFICANT CHANGE UP (ref 0–0.2)
BASOPHILS NFR BLD AUTO: 0.3 % — SIGNIFICANT CHANGE UP (ref 0–2)
BASOPHILS NFR BLD MANUAL: 0 % — SIGNIFICANT CHANGE UP (ref 0–2)
BILIRUB SERPL-MCNC: 1.3 MG/DL — SIGNIFICANT CHANGE UP (ref 0.4–2)
BUN SERPL-MCNC: 10.2 MG/DL — SIGNIFICANT CHANGE UP (ref 8–20)
CALCIUM SERPL-MCNC: 8.6 MG/DL — SIGNIFICANT CHANGE UP (ref 8.4–10.5)
CHLORIDE SERPL-SCNC: 101 MMOL/L — SIGNIFICANT CHANGE UP (ref 96–108)
CO2 SERPL-SCNC: 21 MMOL/L — LOW (ref 22–29)
CREAT SERPL-MCNC: 0.79 MG/DL — SIGNIFICANT CHANGE UP (ref 0.5–1.3)
EGFR: 125 ML/MIN/1.73M2 — SIGNIFICANT CHANGE UP
EGFR: 125 ML/MIN/1.73M2 — SIGNIFICANT CHANGE UP
EOSINOPHIL # BLD AUTO: 0 K/UL — SIGNIFICANT CHANGE UP (ref 0–0.5)
EOSINOPHIL # BLD MANUAL: 0 K/UL — SIGNIFICANT CHANGE UP (ref 0–0.5)
EOSINOPHIL NFR BLD AUTO: 0 % — SIGNIFICANT CHANGE UP (ref 0–6)
EOSINOPHIL NFR BLD MANUAL: 0 % — SIGNIFICANT CHANGE UP (ref 0–6)
GLUCOSE SERPL-MCNC: 121 MG/DL — HIGH (ref 70–99)
HCT VFR BLD CALC: 43 % — SIGNIFICANT CHANGE UP (ref 39–50)
HGB BLD-MCNC: 15.4 G/DL — SIGNIFICANT CHANGE UP (ref 13–17)
IMM GRANULOCYTES # BLD AUTO: 0.01 K/UL — SIGNIFICANT CHANGE UP (ref 0–0.07)
IMM GRANULOCYTES NFR BLD AUTO: 0.2 % — SIGNIFICANT CHANGE UP (ref 0–0.9)
LIDOCAIN IGE QN: 32 U/L — SIGNIFICANT CHANGE UP (ref 22–51)
LYMPHOCYTES # BLD AUTO: 0.62 K/UL — LOW (ref 1–3.3)
LYMPHOCYTES # BLD MANUAL: 0.43 K/UL — LOW (ref 1–3.3)
LYMPHOCYTES NFR BLD AUTO: 9.7 % — LOW (ref 13–44)
LYMPHOCYTES NFR BLD MANUAL: 6.7 % — LOW (ref 13–44)
MANUAL NEUTROPHIL BANDS #: 1.12 K/UL — HIGH (ref 0–0.84)
MANUAL REACTIVE LYMPHOCYTES #: 0.11 K/UL — SIGNIFICANT CHANGE UP (ref 0–0.63)
MCHC RBC-ENTMCNC: 31.5 PG — SIGNIFICANT CHANGE UP (ref 27–34)
MCHC RBC-ENTMCNC: 35.8 G/DL — SIGNIFICANT CHANGE UP (ref 32–36)
MCV RBC AUTO: 87.9 FL — SIGNIFICANT CHANGE UP (ref 80–100)
MONOCYTES # BLD AUTO: 0.44 K/UL — SIGNIFICANT CHANGE UP (ref 0–0.9)
MONOCYTES # BLD MANUAL: 0.16 K/UL — SIGNIFICANT CHANGE UP (ref 0–0.9)
MONOCYTES NFR BLD AUTO: 6.9 % — SIGNIFICANT CHANGE UP (ref 2–14)
MONOCYTES NFR BLD MANUAL: 2.5 % — SIGNIFICANT CHANGE UP (ref 2–14)
NEUTROPHILS # BLD AUTO: 5.31 K/UL — SIGNIFICANT CHANGE UP (ref 1.8–7.4)
NEUTROPHILS # BLD MANUAL: 4.58 K/UL — SIGNIFICANT CHANGE UP (ref 1.8–7.4)
NEUTROPHILS NFR BLD AUTO: 82.9 % — HIGH (ref 43–77)
NEUTROPHILS NFR BLD MANUAL: 71.6 % — SIGNIFICANT CHANGE UP (ref 43–77)
NEUTS BAND # BLD: 17.5 % — HIGH (ref 0–8)
NEUTS BAND NFR BLD: 17.5 % — HIGH (ref 0–8)
NRBC # BLD AUTO: 0 K/UL — SIGNIFICANT CHANGE UP (ref 0–0)
NRBC # FLD: 0 K/UL — SIGNIFICANT CHANGE UP (ref 0–0)
NRBC BLD AUTO-RTO: 0 /100 WBCS — SIGNIFICANT CHANGE UP (ref 0–0)
PLAT MORPH BLD: NORMAL — SIGNIFICANT CHANGE UP
PLATELET # BLD AUTO: 218 K/UL — SIGNIFICANT CHANGE UP (ref 150–400)
PMV BLD: 9.1 FL — SIGNIFICANT CHANGE UP (ref 7–13)
POIKILOCYTOSIS BLD QL AUTO: SLIGHT — SIGNIFICANT CHANGE UP
POTASSIUM SERPL-MCNC: 4 MMOL/L — SIGNIFICANT CHANGE UP (ref 3.5–5.3)
POTASSIUM SERPL-SCNC: 4 MMOL/L — SIGNIFICANT CHANGE UP (ref 3.5–5.3)
PROT SERPL-MCNC: 6.5 G/DL — LOW (ref 6.6–8.7)
RBC # BLD: 4.89 M/UL — SIGNIFICANT CHANGE UP (ref 4.2–5.8)
RBC # FLD: 11.7 % — SIGNIFICANT CHANGE UP (ref 10.3–14.5)
RBC BLD AUTO: ABNORMAL
SODIUM SERPL-SCNC: 136 MMOL/L — SIGNIFICANT CHANGE UP (ref 135–145)
VARIANT LYMPHS # BLD: 1.7 % — SIGNIFICANT CHANGE UP (ref 0–6)
VARIANT LYMPHS NFR BLD MANUAL: 1.7 % — SIGNIFICANT CHANGE UP (ref 0–6)
WBC # BLD: 6.4 K/UL — SIGNIFICANT CHANGE UP (ref 3.8–10.5)
WBC # FLD AUTO: 6.4 K/UL — SIGNIFICANT CHANGE UP (ref 3.8–10.5)

## 2025-07-21 PROCEDURE — 80053 COMPREHEN METABOLIC PANEL: CPT

## 2025-07-21 PROCEDURE — 99284 EMERGENCY DEPT VISIT MOD MDM: CPT | Mod: 25

## 2025-07-21 PROCEDURE — 96374 THER/PROPH/DIAG INJ IV PUSH: CPT

## 2025-07-21 PROCEDURE — 99284 EMERGENCY DEPT VISIT MOD MDM: CPT

## 2025-07-21 PROCEDURE — 96375 TX/PRO/DX INJ NEW DRUG ADDON: CPT

## 2025-07-21 PROCEDURE — 36415 COLL VENOUS BLD VENIPUNCTURE: CPT

## 2025-07-21 PROCEDURE — 85025 COMPLETE CBC W/AUTO DIFF WBC: CPT

## 2025-07-21 PROCEDURE — 83690 ASSAY OF LIPASE: CPT

## 2025-07-21 RX ORDER — ONDANSETRON HCL/PF 4 MG/2 ML
4 VIAL (ML) INJECTION ONCE
Refills: 0 | Status: COMPLETED | OUTPATIENT
Start: 2025-07-21 | End: 2025-07-21

## 2025-07-21 RX ORDER — KETOROLAC TROMETHAMINE 30 MG/ML
15 INJECTION, SOLUTION INTRAMUSCULAR; INTRAVENOUS ONCE
Refills: 0 | Status: DISCONTINUED | OUTPATIENT
Start: 2025-07-21 | End: 2025-07-21

## 2025-07-21 RX ADMIN — Medication 4 MILLIGRAM(S): at 06:32

## 2025-07-21 RX ADMIN — Medication 1000 MILLILITER(S): at 05:17

## 2025-07-21 RX ADMIN — KETOROLAC TROMETHAMINE 15 MILLIGRAM(S): 30 INJECTION, SOLUTION INTRAMUSCULAR; INTRAVENOUS at 05:17

## 2025-07-21 RX ADMIN — Medication 20 MILLIGRAM(S): at 05:17

## 2025-07-21 NOTE — ED PROVIDER NOTE - PHYSICAL EXAMINATION
Gen: No acute distress, non toxic  HEENT: Mucous membranes moist, pink conjunctivae, EOMI  CV: RRR, nl s1/s2.  Resp: CTAB, normal rate and effort  GI: Abdomen soft, NT, ND. No rebound, no guarding  : No CVAT  Neuro: A&O x 3, moving all 4 extremities  Skin: No rashes. intact and perfused.

## 2025-07-21 NOTE — ED PROVIDER NOTE - OBJECTIVE STATEMENT
25 year old male no significant PMhx present to ED for abd pain, diarrhea. pt reports 3 days of lower abdominal discomfort with multiple loose stools per day. NB bowel movements. pt reports tactile fever yesterday, interment abd pain that comes and goes. Denies nausea, vomiting, HA, weakness, SOB, CP, dysuria, urgency frequency.

## 2025-07-21 NOTE — ED PROVIDER NOTE - PATIENT PORTAL LINK FT
You can access the FollowMyHealth Patient Portal offered by [facility] by registering at the following website: [email or web address]. By joining [employer]’s FollowMyHealth portal, you will also be able to view your health information using other applications (apps) compatible with our system.

## 2025-07-21 NOTE — ED PROVIDER NOTE - CLINICAL SUMMARY MEDICAL DECISION MAKING FREE TEXT BOX
25 year old male no significant PMhx present to ED for abd pain, diarrhea. pt reports 3 days of lower abdominal discomfort with multiple loose stools per day. NB bowel movements. pt reports tactile fever yesterday, interment abd pain that comes and goes. Denies nausea, vomiting, HA, weakness, SOB, CP, dysuria, urgency frequency.   bening PE  meds labs reassess 25 year old male no significant PMhx present to ED for abd pain, diarrhea. pt reports 3 days of lower abdominal discomfort with multiple loose stools per day. NB bowel movements. pt reports tactile fever yesterday, interment abd pain that comes and goes. Denies nausea, vomiting, HA, weakness, SOB, CP, dysuria, urgency frequency.   benign PE  meds labs reassess    Pt reassessed, pt feeling better at this time, vss, pt able to walk, talk and vocalized plan of action. Discussed in depth and explained to pt in depth the next steps that need to be taken including proper follow up with PCP or specialists. All incidental findings were discussed with pt as well. Pt verbalized their concerns and all questions were answered. Pt understands dispo and wants discharge. Given good instructions when to return to ED, strict return precautions and importance of f/u.

## 2025-07-21 NOTE — ED ADULT TRIAGE NOTE - CHIEF COMPLAINT QUOTE
Pt complains of abdominal pain and N/D for three days. Pt states that pain was intermittent and now constant.

## 2025-07-21 NOTE — ED ADULT NURSE NOTE - NSFALLUNIVINTERV_ED_ALL_ED
Nutrition Assessment   Assessment Type:   Follow up    Reason for Visit:   Consult    Referral Requested By:   Physician/Staff    Chart Medications Lab Results Reviewed:  yes     Nutritional Risk Factors:   Unintentional weight loss, Poor PO prior to admission, Low BMI, Skin breakdown and Malnutrition    Current Diet Order: Regular  Diet Tolerance: tolerated, good appetite  [employer] / Cultural Preferences: n/a  Food Allergies: no  Priority Points: Status 2    Demographic/Anthropometrics Information  Gender: male   Patient Age: 61 year old  Height:    Ht Readings from Last 1 Encounters:   09/10/22 6' 4\" (1.93 m)      Weight:   Wt Readings from Last 1 Encounters:   09/10/22 67 kg      BMI:   BMI Readings from Last 1 Encounters:   09/10/22 17.98 kg/m²     Ideal Body Weight: 91.8 kg  Usual Body Weight: \"used to weigh 275#\" per patient, unknown timeframe    Weight and Height Weight kg   8/6/2021 61.6 kg   10/11/2021 72.4 kg   10/18/2021 79.4 kg   1/27/2022 69.3 kg   9/9/2022 67 kg   9/10/2022 67 kg     % Weight change: -15.6% x 11 months    Reason for Weight Change: Decreased intake    Physical Appearance: Underweight    Weight Classification: Underweight (BMI < 18.5)    Estimated Nutritional Needs  Assessment Weight: 67 kg  Energy Needs: 30-35 kcal/kg   7295-7813 kcal/day  Protein Needs: 1.25-1.5 g/kg   grams/day    Nutrition Diagnosis (PES)  Increased nutrient needs related to Increased nutritional demands for healing as evidenced by Calculated needs      Nutrition Plan  Current Nutrition Therapy: Diet and Snacks/ supplements    Continue Nutrition Therapy: Diet and Snacks/ supplements    Recommended Nutrition Intervention: Coordination of nutrition care by a nutrition professional, Meals and snacks  and nutrition supplemental therapy    Monitor: Biochemical data, medical tests, procedures, Food and beverage intake and Weight    Recommend: Other: add supplements    Discharge Needs: Pending    Care Plan Discussed  With: Patient    Goals: Increase oral intake to >/=50%of meals and supplements    Goal Progress: Met and Extended    Timeframe to Achieve Goal: Ongoing    Dietitian Notes/Impressions/Recommendations:  60 y/o M with pmh including but not limited to cerebral infarction, HTN, chronic pain, depression. Presented with UTI, rhabdomyolysis.     9/10: Consult for RN documented wounds -- nothing in flowsheets from current admission and wound care eval pending. Hx of stage 4 pressure injuries. RN on duty reported skin breakdown to coccyx, suspects to be pressure related. Patient was seen in chair at bedside. Noted AMS on admission. Speech seemed tangential, difficult to keep on topic at hand and required multiple redirections. Patient was very upset about his living situation, reported he lives in a basement and only receives food 1-2x per day, is undernourished, and is dehydrated. Unable to go up the stairs. 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 Ordered a large breakfast and looked forward to lunch. Discussed ONS, patient fixated on protein and \"needing a lot of protein\". Amenable to strawberry flavor and enjoys Ensure; also mentioned having Juan in the past. Will order Mighty Shake while Ensure is out of stock. Limited NFPE significant for severe muscle depletion to clavicles, upper extremities and severe fat wasting to triceps and orbital region.    9/14: Pt with good intakes documented. Pt reports consuming juan every day; full cup with juan noted on bedside table. Verified with RN, pt is attempting to complete Juan. Reports drinking \"milks\" (mighty shake). Encouraged ongoing good intake. Pt open to trying additional ONS as needed.     PLAN/RECOMMENDATIONS  1. Current diet: Regular  2. Oral nutrition supplement:   -Continue Mighty Shake x 2 TID with meals  -Juan BID with water  3. RD following intake trends, weight,  labs.  Further recommendations based on clinical course.    Malnutrition Status: Pt with Chronic Severe Protein Calorie Malnutrition as evidenced by </= to 75% of estimated requirements for >/= to 1 month, severe body fat depletion, severe muscle mass depletion, reduced functional capacity.       Bed/Stretcher in lowest position, wheels locked, appropriate side rails in place/Call bell, personal items and telephone in reach/Instruct patient to call for assistance before getting out of bed/chair/stretcher/Non-slip footwear applied when patient is off stretcher/[place] to call system/Physically safe environment - no spills, clutter or unnecessary equipment/Purposeful proactive rounding/Room/bathroom lighting operational, light cord in reach

## 2025-07-21 NOTE — ED PROVIDER NOTE - ATTENDING APP SHARED VISIT CONTRIBUTION OF CARE
Kwaku [id number]-old male with no known medical problem presents with 10 episodes of diarrhea and feeling tired and weak.  Patient had no recent travel or sick contact.  Patient had subjective fever no chills.  No blood in the stool    Patient is alert well-appearing male, S1-S2 normal regular, bilateral clear breath sounds, abdomen is soft nontender nondistended, neuroexam is oriented x 3 no focal deficits, skin warm dry good turgor    Patient is presenting with diarrhea and likely is dehydrated plan to check labs rule out any electrolyte abnormalities and hydrate him if electrolytes are hydrated and normal plan to discharge him and p.o. hydration with coconut and banana

## 2025-07-21 NOTE — ED ADULT NURSE NOTE - OBJECTIVE STATEMENT
Patient is a 24 yo male who presents to the ED with complaints of abdominal pain. Pt reports abdominal pain and N/D for three days. Pt states that pain was intermittent and now constant. Upon assessment, PT is alert and oriented, with a patent and self maintained airway, with non labored breathing. PT denies CP, SOB, HA Fevers or chills.
